# Patient Record
Sex: MALE | Race: WHITE
[De-identification: names, ages, dates, MRNs, and addresses within clinical notes are randomized per-mention and may not be internally consistent; named-entity substitution may affect disease eponyms.]

---

## 2018-03-20 ENCOUNTER — HOSPITAL ENCOUNTER (EMERGENCY)
Dept: HOSPITAL 56 - MW.ED | Age: 28
Discharge: HOME | End: 2018-03-20
Payer: COMMERCIAL

## 2018-03-20 DIAGNOSIS — Z88.5: ICD-10-CM

## 2018-03-20 DIAGNOSIS — S40.012A: Primary | ICD-10-CM
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
## 2018-03-20 DIAGNOSIS — M62.838: ICD-10-CM

## 2018-03-20 DIAGNOSIS — M54.2: ICD-10-CM

## 2018-03-20 NOTE — CR
EXAMINATION: Left shoulder

 

HISTORY: Pain

 

COMPARISON: None

 

TECHNIQUE: 3 views

 

FINDINGS/IMPRESSION: There is no acute osseous abnormality, dislocation, or fracture. Calcific densit
ies project over the humeral head, possibly representing early calcific tendinitis. Joint spaces and 
bone mineralization are otherwise preserved.

## 2018-03-20 NOTE — EDM.PDOC
ED HPI GENERAL MEDICAL PROBLEM





- General


Chief Complaint: Upper Extremity Injury/Pain


Stated Complaint: FALL


Time Seen by Provider: 03/20/18 14:10


Source of Information: Reports: Patient





- History of Present Illness


INITIAL COMMENTS - FREE TEXT/NARRATIVE: 





HISTORY AND PHYSICAL:


History of present illness:


[27-year-old male presents with left shoulder pain/neck pain/headache he rates 

7 out of 10 for each problem nonradiating





Night he was entering his pickup his foot slipped on the floor mat he fell back 

landing on his left shoulder neck and head now complaining of pain otherwise no 

apparent distress





No fever nausea vomiting chills sweats no chest pain shortness breath dizziness 

or palpitation no bowel or urine symptoms





]


Review of systems: 


As per history of present illness and below otherwise all systems reviewed and 

negative.


Past medical history: 


As per history of present illness and as reviewed below otherwise 

noncontributory.


Surgical history: 


As per history of present illness and as reviewed below otherwise 

noncontributory.


Social history: 


No reported history of drug or alcohol abuse.


Family history: 


As per history of present illness and as reviewed below otherwise 

noncontributory.








Physical exam:


HEENT: Atraumatic, normocephalic, pupils reactive, negative for conjunctival 

pallor or scleral icterus, mucous membranes moist, throat clear, neck supple, 

nontender, trachea midline.


Lungs: Clear to auscultation, breath sounds equal bilaterally, chest nontender.


Heart: S1S2, regular, negative for clicks, rubs, or JVD.


Abdomen: Soft, nondistended, nontender. Negative for masses or 

hepatosplenomegaly. Negative for costovertebral tenderness.


Pelvis: Stable nontender.


Genitourinary: Deferred.


Rectal: Deferred.


Extremities: Atraumatic, negative for cords or calf pain. Neurovascular 

unremarkable.


Neuro: Awake, alert, oriented. Cranial nerves II through XII unremarkable. 

Cerebellum unremarkable. Motor and sensory unremarkable throughout. Exam 

nonfocal.


Left upper extremity no pain with head movement full range of motion passively 

however even this is somewhat limited due to pain overall the exam is limited 

due to pain limb is neurovascularly intact wrist and elbow and affected





Diagnostics:


[Head CT no contrast


Cervical spine no contrast


Left shoulder complete





]


Therapeutics:


[]Flexeril 10 mg by mouth 3 times a day when necessary #30 no refill


Toradol 10 mg by mouth 3 times a day when necessary #15 no refill


Ice 20 minute intervals


Sling for comfort


Follow-up with sore throat in 7-10 days








Impression: 


Muscle spasm


[Contusion


Neck pain


Left shoulder pain]


Cannot rule out rotator cuff or labrum tear etc. due to limited exam secondary 

to pain


Definitive disposition and diagnosis as appropriate pending reevaluation and 

review of above.


  ** Left Shoulder


Pain Score (Numeric/FACES): 8





- Related Data


 Allergies











Allergy/AdvReac Type Severity Reaction Status Date / Time


 


codeine Allergy  Other Verified 03/20/18 13:58











Home Meds: 


 Home Meds





Escitalopram [Lexapro] 10 mg PO DAILY 03/20/18 [History]











Review of Systems





- Review of Systems


Review Of Systems: ROS reveals no pertinent complaints other than HPI.





ED EXAM, GENERAL





- Physical Exam


Exam: See Below





Course





- Vital Signs


Last Recorded V/S: 


 Last Vital Signs











Temp  98.2 F   03/20/18 14:02


 


Pulse  70   03/20/18 14:02


 


Resp  20   03/20/18 14:02


 


BP  116/68   03/20/18 14:02


 


Pulse Ox  100   03/20/18 14:02














Departure





- Departure


Time of Disposition: 15:39


Disposition: Home, Self-Care 01


Condition: Good


Clinical Impression: 


 Muscle spasm, Contusion








- Discharge Information


Referrals: 


PCP,None [Primary Care Provider] - 


Forms:  ED Department Discharge


Additional Instructions: 


Medication as prescribed


Return if symptoms persist or worsen


Sling for comfort


Work note for 48 hours off


Follow-up with orthopedist, call phone number below to schedule appropriate 

follow-up





[facility]


[address], [address]


[place] [address]


Phone: [phone]


Fax: [phone] my orthopedic





The following information is given to patients seen in the emergency department 

who are being discharged to home. This information is to outline your options 

for follow-up care. We provide all patients seen in our emergency department 

with a follow-up referral.





The need for follow-up, as well as the timing and circumstances, are variable 

depending upon the specifics of your emergency department visit.





If you don't have a primary care physician on staff, we will provide you with a 

referral. We always advise you to contact your personal physician following an 

emergency department visit to inform them of the circumstance of the visit and 

for follow-up with them and/or the need for any referrals to a consulting 

specialist.





The emergency department will also refer you to a specialist when appropriate. 

This referral assures that you have the opportunity for follow-up care with a 

specialist. All of these measure are taken in an effort to provide you with 

optimal care, which includes your follow-up.





Under all circumstances we always encourage you to contact your private 

physician who remains a resource for coordinating your care. When calling for 

follow-up care, please make the office aware that this follow-up is from your 

recent emergency room visit. If for any reason you are refused follow-up, 

please contact the [facility] emergency department at [phone] 

and asked to speak to the emergency department charge nurse.

## 2018-03-20 NOTE — CT
EXAMINATION: CT cervical spine

 

HISTORY: Pain

 

COMPARISON: None

 

TECHNIQUE: Axial CT images obtained through the cervical spine without contrast. Coronal and sagittal
 reconstructions obtained.

 

FINDINGS: The cervical spinal alignment is normal. The vertebral body heights and disc spaces appear 
well-maintained. There is no fracture or acute osseous abnormality. Bone mineralization is normal. Pa
ravertebral soft tissues are normal. Lung apices are clear.

 

IMPRESSION: No acute cervical spinal abnormality.

## 2019-09-02 ENCOUNTER — HOSPITAL ENCOUNTER (EMERGENCY)
Dept: HOSPITAL 56 - MW.ED | Age: 29
Discharge: HOME | End: 2019-09-02
Payer: SELF-PAY

## 2019-09-02 DIAGNOSIS — Z88.5: ICD-10-CM

## 2019-09-02 DIAGNOSIS — Z79.899: ICD-10-CM

## 2019-09-02 DIAGNOSIS — H57.13: Primary | ICD-10-CM

## 2019-09-02 PROCEDURE — 99283 EMERGENCY DEPT VISIT LOW MDM: CPT

## 2019-09-02 PROCEDURE — 96372 THER/PROPH/DIAG INJ SC/IM: CPT
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
## 2023-10-03 ENCOUNTER — APPOINTMENT (RX ONLY)
Dept: URBAN - METROPOLITAN AREA CLINIC 17 | Facility: CLINIC | Age: 33
Setting detail: DERMATOLOGY
End: 2023-10-03

## 2023-10-03 DIAGNOSIS — D22 MELANOCYTIC NEVI: ICD-10-CM

## 2023-10-03 PROBLEM — D22.39 MELANOCYTIC NEVI OF OTHER PARTS OF FACE: Status: ACTIVE | Noted: 2023-10-03

## 2023-10-03 PROCEDURE — ? SHAVE REMOVAL

## 2023-10-03 PROCEDURE — 11310 SHAVE SKIN LESION 0.5 CM/<: CPT

## 2023-10-03 ASSESSMENT — LOCATION SIMPLE DESCRIPTION DERM: LOCATION SIMPLE: LEFT CHEEK

## 2023-10-03 ASSESSMENT — LOCATION DETAILED DESCRIPTION DERM: LOCATION DETAILED: LEFT LATERAL BUCCAL CHEEK

## 2023-10-03 ASSESSMENT — LOCATION ZONE DERM: LOCATION ZONE: FACE

## 2023-10-03 NOTE — PROCEDURE: SHAVE REMOVAL
Medical Necessity Information: It is in your best interest to select a reason for this procedure from the list below. All of these items fulfill various CMS LCD requirements except the new and changing color options.
Medical Necessity Clause: This procedure was medically necessary because the lesion that was treated was:  painful and inflamed
Lab: [id number]
Lab Facility: [id number]
Detail Level: Detailed
Was A Bandage Applied: Yes
Size Of Lesion In Cm (Required): 0.5
Depth Of Shave: dermis
Biopsy Method: double edge Personna blade
Anesthesia Type: 1% lidocaine with epinephrine
Hemostasis: Drysol
Wound Care: Vaseline
Render Path Notes In Note?: No
Consent was obtained from the patient. The risks and benefits to therapy were discussed in detail. Specifically, the risks of infection, scarring, bleeding, prolonged wound healing, incomplete removal, allergy to anesthesia, nerve injury and recurrence were addressed. Prior to the procedure, the treatment site was clearly identified and confirmed by the patient. All components of Universal Protocol/PAUSE Rule completed.
Post-Care Instructions: I reviewed with the patient in detail post-care instructions. Patient is to keep the biopsy site dry overnight, and then apply bacitracin twice daily until healed. Patient may apply hydrogen peroxide soaks to remove any crusting.
Notification Instructions: Patient will be notified of pathology results. However, patient instructed to call the office if not contacted within 2 weeks.
Billing Type: Third-Party Bill